# Patient Record
Sex: MALE | Race: WHITE | NOT HISPANIC OR LATINO | Employment: FULL TIME | URBAN - NONMETROPOLITAN AREA
[De-identification: names, ages, dates, MRNs, and addresses within clinical notes are randomized per-mention and may not be internally consistent; named-entity substitution may affect disease eponyms.]

---

## 2023-06-29 ENCOUNTER — OFFICE VISIT (OUTPATIENT)
Dept: PRIMARY CARE | Facility: CLINIC | Age: 33
End: 2023-06-29
Payer: COMMERCIAL

## 2023-06-29 VITALS
BODY MASS INDEX: 37.19 KG/M2 | SYSTOLIC BLOOD PRESSURE: 134 MMHG | HEIGHT: 77 IN | OXYGEN SATURATION: 97 % | HEART RATE: 71 BPM | DIASTOLIC BLOOD PRESSURE: 98 MMHG | WEIGHT: 315 LBS

## 2023-06-29 DIAGNOSIS — F41.9 ANXIETY: Primary | ICD-10-CM

## 2023-06-29 PROCEDURE — 99213 OFFICE O/P EST LOW 20 MIN: CPT | Performed by: FAMILY MEDICINE

## 2023-06-29 PROCEDURE — 1036F TOBACCO NON-USER: CPT | Performed by: FAMILY MEDICINE

## 2023-06-29 RX ORDER — LORAZEPAM 0.5 MG/1
0.5 TABLET ORAL EVERY 8 HOURS PRN
Qty: 42 TABLET | Refills: 0 | Status: SHIPPED | OUTPATIENT
Start: 2023-06-29 | End: 2023-07-13

## 2023-06-29 NOTE — PROGRESS NOTES
"Subjective   Patient ID: Doug Zabala is a 32 y.o. male who presents for discuss medication.    HPI   Known adhd, has court and is anxious with sx of insomnia, cardiac awareness, nausea,liteheaded, poor concentration.  I have personally reviewed the [facility] report for the above patient.  This report is scanned into the electronic medical record.  I have considered the risks of abuse, dependence, addiction, and diversion.  I believe that it is clinically appropriate for the above patient to be prescribed this medication.    Review of Systems    Objective   BP (!) 134/98   Pulse 71   Ht 1.943 m (6' 4.5\")   Wt (!) 155 kg (342 lb 1.6 oz)   SpO2 97%   BMI 41.10 kg/m²     Physical Exam  Mood seems okay as somewhat anxious tone when discussing upcoming court  Risk benefits and alternatives reviewed and given the situational nature compounded by less than 1 week before court feel it is appropriate to use lorazepam.  He has used before without difficulty.  Assessment/Plan   Problem List Items Addressed This Visit          Mental Health    Anxiety - Primary    Relevant Medications    LORazepam (Ativan) 0.5 mg tablet          "

## 2025-08-25 ENCOUNTER — APPOINTMENT (OUTPATIENT)
Age: 35
End: 2025-08-25
Payer: COMMERCIAL

## 2025-08-25 VITALS
HEIGHT: 77 IN | BODY MASS INDEX: 34.36 KG/M2 | DIASTOLIC BLOOD PRESSURE: 80 MMHG | WEIGHT: 291 LBS | OXYGEN SATURATION: 97 % | SYSTOLIC BLOOD PRESSURE: 134 MMHG | HEART RATE: 89 BPM

## 2025-08-25 DIAGNOSIS — R73.9 HYPERGLYCEMIA: ICD-10-CM

## 2025-08-25 DIAGNOSIS — R03.0 ELEVATED BP WITHOUT DIAGNOSIS OF HYPERTENSION: ICD-10-CM

## 2025-08-25 DIAGNOSIS — F90.0 ATTENTION DEFICIT HYPERACTIVITY DISORDER (ADHD), PREDOMINANTLY INATTENTIVE TYPE: ICD-10-CM

## 2025-08-25 DIAGNOSIS — Z00.00 WELL ADULT EXAM: Primary | ICD-10-CM

## 2025-08-25 DIAGNOSIS — G47.33 OSA (OBSTRUCTIVE SLEEP APNEA): ICD-10-CM

## 2025-08-25 DIAGNOSIS — L30.9 DERMATITIS: ICD-10-CM

## 2025-08-25 DIAGNOSIS — F41.9 ANXIETY: ICD-10-CM

## 2025-08-25 DIAGNOSIS — E78.2 MIXED HYPERLIPIDEMIA: ICD-10-CM

## 2025-08-25 PROCEDURE — 1036F TOBACCO NON-USER: CPT | Performed by: FAMILY MEDICINE

## 2025-08-25 PROCEDURE — 99395 PREV VISIT EST AGE 18-39: CPT | Performed by: FAMILY MEDICINE

## 2025-08-25 PROCEDURE — 3008F BODY MASS INDEX DOCD: CPT | Performed by: FAMILY MEDICINE

## 2025-08-25 RX ORDER — ESCITALOPRAM OXALATE 10 MG/1
10 TABLET ORAL DAILY
Qty: 30 TABLET | Refills: 11 | Status: SHIPPED | OUTPATIENT
Start: 2025-08-25 | End: 2026-08-25

## 2025-08-25 ASSESSMENT — ENCOUNTER SYMPTOMS
ARTHRALGIAS: 0
LIGHT-HEADEDNESS: 0
ABDOMINAL PAIN: 0
CONSTIPATION: 0
ADENOPATHY: 0
SLEEP DISTURBANCE: 0
FREQUENCY: 1
ABDOMINAL DISTENTION: 0
COUGH: 0
NAUSEA: 0
DIARRHEA: 0
NUMBNESS: 0
DIZZINESS: 0
UNEXPECTED WEIGHT CHANGE: 0
DIFFICULTY URINATING: 0
PALPITATIONS: 0
SHORTNESS OF BREATH: 0
FATIGUE: 1
HEADACHES: 0
DYSPHORIC MOOD: 0
WHEEZING: 0
VOMITING: 0
RHINORRHEA: 0
APPETITE CHANGE: 0
TROUBLE SWALLOWING: 0
NERVOUS/ANXIOUS: 1
ACTIVITY CHANGE: 0

## 2025-09-26 ENCOUNTER — APPOINTMENT (OUTPATIENT)
Age: 35
End: 2025-09-26
Payer: COMMERCIAL